# Patient Record
(demographics unavailable — no encounter records)

---

## 2024-10-11 NOTE — HISTORY OF PRESENT ILLNESS
[Sudden] : sudden [7] : 7 [8] : 8 [Dull/Aching] : dull/aching [de-identified] : 04/23/2024 Ms. CARLOS HUSTON is a 76 year female that comes in today with a chief complaint of NATHALIE KNEES pain (L>R). pt states she started having pain 6 months ago pain radiating down her foot. Difficulty bending down. She has been using topicals with some relief. Denies history of prior injury.  10/11/24 pt is here for follow up on NATHALIE knees, still having pain, says she is now having pain in her left knee and leg, trouble with going up and down stairs and bending down,  [] : no [FreeTextEntry7] : foot

## 2024-10-11 NOTE — ASSESSMENT
[FreeTextEntry1] : 76 year F with bilateral knee OA with L>R - physical therapy and NSAIDs (mobic) was prescribed - Return in 6 weeks for follow up , CSI if no improvement  10/11/2024 bilateral knee OA CSI of bilateral knees recommend weight loss   Time Based billin minutes was spent with the patient today taking the patient's history, conducting a physical examination, reviewing imaging studies, and  detailed discussion regarding the diagnosis and treatment plan.

## 2024-10-11 NOTE — IMAGING
[de-identified] : RIGHT KNEE EXAM Alignment: Valgus Effusion: None Atrophy: None                                                 Stable to Varus/valgus stress Posterior Drawer Test: negative Anterior Drawer Test: Negative Knee Extension/Flexion: 0 / 110   Medial/lateral compartments Medial joint line: Tenderness Lateral joint line: Tenderness Brandy test: negative   Patellofemoral joint Medial patellar facet: no tenderness Patellar grind: Negative   Tendons: Pes Anserine: No tenderness Gerdys Tubercle/ IT Band: No tenderness Quadriceps Tendon: No Tenderness patellar tendon: no Tenderness Tibial tubercle: not tenderness Calf: no Tenderness   Neurovascular exam Muscle strength: 5/5 Sensation to light touch: intact Distal pulses: 2+  LEFT KNEE EXAM Alignment:  Valgus Effusion: None Atrophy: None                                                 Stable to Varus/valgus stress Posterior Drawer Test: negative Anterior Drawer Test: Negative Knee Extension/Flexion: 0 / 110   Medial/lateral compartments Medial joint line: Tenderness Lateral joint line: Tenderness Brandy test: negative   Patellofemoral joint Medial patellar facet: no tenderness Patellar grind: Negative   Tendons: Pes Anserine: No tenderness Gerdys Tubercle/ IT Band: No tenderness Quadriceps Tendon: No Tenderness patellar tendon: no Tenderness Tibial tubercle: not tenderness Calf: no Tenderness   Neurovascular exam Muscle strength: 5/5 Sensation to light touch: intact Distal pulses: 2+   [id number] Xrays of the RIGHT Knee were taken demonstrating moderate tricomp OA with medial narrowing 2024 Xrays of the LEFT Knee were taken demonstrating moderate tricomp OA

## 2025-01-15 NOTE — REVIEW OF SYSTEMS
[Joint Pain] : joint pain [Joint Stiffness] : joint stiffness [Joint Swelling] : joint swelling [Muscle Pain] : muscle pain [Negative] : Heme/Lymph [Muscle Weakness] : no muscle weakness [Back Pain] : no back pain

## 2025-01-15 NOTE — PLAN
[FreeTextEntry1] : Routine blood work drawn in office today. Trial of Celebrex 200mg QD PRN. Renew meds. Pt advised to sign up for [facility] portal to review labs and communicate any questions or concerns directly. Yearly physical and return as needed for illness, medication refills, and new or existing complaints. f/u 4-6 months.

## 2025-01-15 NOTE — HISTORY OF PRESENT ILLNESS
[de-identified] : 76 year old F with PMH HLD and pre-DM presents for follow up. Pt denies CP/SOB, fever/chills, n/v/d/c.

## 2025-01-15 NOTE — PHYSICAL EXAM
[No Acute Distress] : no acute distress [Well-Appearing] : well-appearing [No Respiratory Distress] : no respiratory distress  [No Accessory Muscle Use] : no accessory muscle use [Coordination Grossly Intact] : coordination grossly intact [No Focal Deficits] : no focal deficits [Normal Gait] : normal gait [Normal Affect] : the affect was normal [Normal Insight/Judgement] : insight and judgment were intact [de-identified] : obesity [de-identified] : right lateral ankle swelling